# Patient Record
(demographics unavailable — no encounter records)

---

## 2024-12-10 NOTE — END OF VISIT
[FreeTextEntry3] : Documented by Sandy Mario acting as a scribe for Joseline Izquierdo on 12/09/2024   All medical record entries made by the Scribe were at my, Dr. Joseline Izquierdo direction and personally dictated by me on 12/09/2024 . I have reviewed the chart and agree that the record accurately reflects my personal performance of the history, physical exam, assessment and plan. I have also personally directed, reviewed, and agreed with the chart. [Time Spent: ___ minutes] : I have spent [unfilled] minutes of time on the encounter which excludes teaching and separately reported services.

## 2024-12-10 NOTE — HISTORY OF PRESENT ILLNESS
[de-identified] : TANG DONALDSON is a 56-year-old male with worsening pain in the right hip, which has been persistent and is affecting sleep, waking the patient up at night. The pain is described as being located in the posterior lateral region of the hip. The patient denies any numbness, tingling down the leg, or changes in bladder-bowel function. The patient experiences relief with the use of a towel for support while driving and performing stretches, although the relief is temporary. The patient has been taking Motrin for pain relief. The patient engages in swimming three times a week, which seems to help alleviate symptoms. The patient describes a snapping sensation in the hip area during certain stretches. The patient attributes some of the pain to occupational activities, specifically standing and using the right foot frequently during procedures.

## 2024-12-10 NOTE — DISCUSSION/SUMMARY

## 2024-12-10 NOTE — PHYSICAL EXAM
[de-identified] : General: Well-nourished, well-developed, alert, and in no acute distress. Head: Normocephalic. Eyes: Pupils equal, extraocular muscles intact, normal sclera. Nose: No nasal discharge. Cardiovascular: Extremities are warm and well perfused. Respiratory: No labored breathing. Extremities: Sensation is intact distally bilaterally.  Lymphatic: No regional lymphadenopathy, no lymphedema Neurologic: No focal deficits Skin: Normal skin color, texture, and turgor Psychiatric: Normal affect  MSK: Examination of the Lumbar Spine: Gait normal  Ambulating independently Able to toe walk, heel walk, tandem walk AROM: forward flexion, extension full and pain-free Tender to palpation: right GTB, piriformis, gluteals Nontender to palpation: midline, paraspinals, SI jt, left GTB, piriformis, gluteals   Lumbar Facet Loading [negative]     Right hip Range of Motion: Internal rotation: [25] degrees, External rotation: [80] degrees, Flexion [120] degrees     Log roll negative JONAS pain lateral FADIR pain lateral Darrel positive Ely positive   SLR negative. Tight Hamstrings Piriformis compression negative ASIS distraction negative Iliac compression negative   Left hip:   Range of Motion: Internal rotation: [25] degrees, External rotation: [80] degrees, Flexion [120] degrees   Special tests: JONAS negative FADIR negative Darrel positive Ely positive   SLR negative. Tight Hamstrings Piriformis compression negative ASIS distraction negative Iliac compression negative     Sensation is intact to light touch over the superficial and deep peroneal nerve distributions and the posterior tibial nerve distribution. Capillary refill is less than two seconds. Posterior tibial and dorsalis pedis pulses 2+ equal bilaterally. No calf swelling or tenderness bilaterally. Strength testing shows Hip flexion 5/5, Hip adduction 5/5, Hip abduction 5/5, Knee Extension 5/5, Knee Flexion 5/5, dorsiflexion 5/5, plantar flexion 5/5, EHL 5/5 Reflexes: Patellar 2+, Achilles 2+       [de-identified] : XR bilateral hip (12/9/24): There is no evidence of fracture or dislocation. The joint spaces are maintained. There is a mild femoral CAM deformity on the right.

## 2024-12-10 NOTE — HISTORY OF PRESENT ILLNESS
[de-identified] : TANG DONALDSON is a 56-year-old male with worsening pain in the right hip, which has been persistent and is affecting sleep, waking the patient up at night. The pain is described as being located in the posterior lateral region of the hip. The patient denies any numbness, tingling down the leg, or changes in bladder-bowel function. The patient experiences relief with the use of a towel for support while driving and performing stretches, although the relief is temporary. The patient has been taking Motrin for pain relief. The patient engages in swimming three times a week, which seems to help alleviate symptoms. The patient describes a snapping sensation in the hip area during certain stretches. The patient attributes some of the pain to occupational activities, specifically standing and using the right foot frequently during procedures.

## 2024-12-10 NOTE — ADDENDUM
[FreeTextEntry1] : I, Sandy Mario ([facility]) assisted in filling out this chart under the dictation of Joseline Izquierdo on 12/09/2024 . MEDICINE

## 2024-12-10 NOTE — ADDENDUM
[FreeTextEntry1] : I, Sandy Mario ([facility]) assisted in filling out this chart under the dictation of Joseline Izquierdo on 12/09/2024 .

## 2024-12-10 NOTE — ASSESSMENT
[FreeTextEntry1] : TANG DONALDSON is a 56-year-old male with right hip pain. I discussed with the patient that their symptoms, signs, and imaging are most consistent with iliotibial band syndrome, greater trochanteric pain syndrome, and gluteus medius tendinopathy, along with hip flexor tightness.  We reviewed the natural history of this condition and treatment options.  We agreed on the following plan:   X-ray taken and reviewed with patient today Activity modification: low-impact aerobic activity (stationary bike, elliptical, swimming) Recommend 150 min per week of moderate-intensity aerobic activity  Start Home Exercises for GTB hip and iliotibial band conditioning. Demonstration and handout provided.  Start physical therapy. Referral provided. Medication: Continue with ibuprofen as needed. Discussed diclofenac gel; patient reports it has been ineffective in the past. Can try Arnica/CBD topical. Advanced imaging: consider MRI if no symptomatic improvement  Follow up in 6-8 weeks.

## 2024-12-10 NOTE — PHYSICAL EXAM
[de-identified] : General: Well-nourished, well-developed, alert, and in no acute distress. Head: Normocephalic. Eyes: Pupils equal, extraocular muscles intact, normal sclera. Nose: No nasal discharge. Cardiovascular: Extremities are warm and well perfused. Respiratory: No labored breathing. Extremities: Sensation is intact distally bilaterally.  Lymphatic: No regional lymphadenopathy, no lymphedema Neurologic: No focal deficits Skin: Normal skin color, texture, and turgor Psychiatric: Normal affect  MSK: Examination of the Lumbar Spine: Gait normal  Ambulating independently Able to toe walk, heel walk, tandem walk AROM: forward flexion, extension full and pain-free Tender to palpation: right GTB, piriformis, gluteals Nontender to palpation: midline, paraspinals, SI jt, left GTB, piriformis, gluteals   Lumbar Facet Loading [negative]     Right hip Range of Motion: Internal rotation: [25] degrees, External rotation: [80] degrees, Flexion [120] degrees     Log roll negative JONAS pain lateral FADIR pain lateral Darrel positive Ely positive   SLR negative. Tight Hamstrings Piriformis compression negative ASIS distraction negative Iliac compression negative   Left hip:   Range of Motion: Internal rotation: [25] degrees, External rotation: [80] degrees, Flexion [120] degrees   Special tests: JONAS negative FADIR negative Darrel positive Ely positive   SLR negative. Tight Hamstrings Piriformis compression negative ASIS distraction negative Iliac compression negative     Sensation is intact to light touch over the superficial and deep peroneal nerve distributions and the posterior tibial nerve distribution. Capillary refill is less than two seconds. Posterior tibial and dorsalis pedis pulses 2+ equal bilaterally. No calf swelling or tenderness bilaterally. Strength testing shows Hip flexion 5/5, Hip adduction 5/5, Hip abduction 5/5, Knee Extension 5/5, Knee Flexion 5/5, dorsiflexion 5/5, plantar flexion 5/5, EHL 5/5 Reflexes: Patellar 2+, Achilles 2+       [de-identified] : XR bilateral hip (12/9/24): There is no evidence of fracture or dislocation. The joint spaces are maintained. There is a mild femoral CAM deformity on the right.